# Patient Record
Sex: FEMALE | Race: WHITE | NOT HISPANIC OR LATINO
[De-identification: names, ages, dates, MRNs, and addresses within clinical notes are randomized per-mention and may not be internally consistent; named-entity substitution may affect disease eponyms.]

---

## 2017-03-20 ENCOUNTER — APPOINTMENT (OUTPATIENT)
Dept: BREAST CENTER | Facility: CLINIC | Age: 72
End: 2017-03-20

## 2017-03-20 VITALS
TEMPERATURE: 99.3 F | HEIGHT: 62 IN | SYSTOLIC BLOOD PRESSURE: 132 MMHG | WEIGHT: 125 LBS | BODY MASS INDEX: 23 KG/M2 | HEART RATE: 79 BPM | DIASTOLIC BLOOD PRESSURE: 72 MMHG

## 2017-03-20 RX ORDER — LETROZOLE 2.5 MG/1
TABLET, FILM COATED ORAL
Refills: 0 | Status: ACTIVE | COMMUNITY

## 2017-03-20 RX ORDER — TESTOSTERONE 75 MG/1
PELLET SUBCUTANEOUS
Refills: 0 | Status: ACTIVE | COMMUNITY

## 2017-04-06 ENCOUNTER — APPOINTMENT (OUTPATIENT)
Dept: PLASTIC SURGERY | Facility: CLINIC | Age: 72
End: 2017-04-06

## 2017-04-06 DIAGNOSIS — N65.1 DISPROPORTION OF RECONSTRUCTED BREAST: ICD-10-CM

## 2017-04-06 DIAGNOSIS — N65.1 DEFORMITY OF RECONSTRUCTED BREAST: ICD-10-CM

## 2017-04-06 DIAGNOSIS — C50.212 MALIGNANT NEOPLASM OF UPPER-INNER QUADRANT OF LEFT FEMALE BREAST: ICD-10-CM

## 2017-04-06 DIAGNOSIS — D17.1 BENIGN LIPOMATOUS NEOPLASM OF SKIN AND SUBCUTANEOUS TISSUE OF TRUNK: ICD-10-CM

## 2017-04-06 DIAGNOSIS — C50.912 MALIGNANT NEOPLASM OF UNSPECIFIED SITE OF LEFT FEMALE BREAST: ICD-10-CM

## 2017-04-06 DIAGNOSIS — R92.8 OTHER ABNORMAL AND INCONCLUSIVE FINDINGS ON DIAGNOSTIC IMAGING OF BREAST: ICD-10-CM

## 2017-04-06 DIAGNOSIS — Z90.12 ACQUIRED ABSENCE OF LEFT BREAST AND NIPPLE: ICD-10-CM

## 2017-04-06 DIAGNOSIS — N65.0 DEFORMITY OF RECONSTRUCTED BREAST: ICD-10-CM

## 2017-04-23 PROBLEM — D17.1 LIPOMA OF TORSO: Status: ACTIVE | Noted: 2017-04-23

## 2017-04-23 PROBLEM — N65.1 BREAST ASYMMETRY FOLLOWING RECONSTRUCTIVE SURGERY: Status: ACTIVE | Noted: 2017-04-23

## 2017-04-23 PROBLEM — Z90.12 ABSENCE OF NIPPLE, ACQUIRED, LEFT: Status: ACTIVE | Noted: 2017-04-23

## 2017-04-23 PROBLEM — N65.0 DEFORMITY AND DISPROPORTION OF RECONSTRUCTED BREAST: Status: ACTIVE | Noted: 2017-04-23

## 2017-08-28 ENCOUNTER — MESSAGE (OUTPATIENT)
Age: 72
End: 2017-08-28

## 2017-09-07 ENCOUNTER — FORM ENCOUNTER (OUTPATIENT)
Age: 72
End: 2017-09-07

## 2017-09-08 ENCOUNTER — RESULT REVIEW (OUTPATIENT)
Age: 72
End: 2017-09-08

## 2017-09-08 ENCOUNTER — OUTPATIENT (OUTPATIENT)
Dept: OUTPATIENT SERVICES | Facility: HOSPITAL | Age: 72
LOS: 1 days | End: 2017-09-08
Payer: MEDICARE

## 2017-09-08 DIAGNOSIS — R92.1 MAMMOGRAPHIC CALCIFICATION FOUND ON DIAGNOSTIC IMAGING OF BREAST: ICD-10-CM

## 2017-09-08 DIAGNOSIS — H26.9 UNSPECIFIED CATARACT: Chronic | ICD-10-CM

## 2017-09-08 DIAGNOSIS — Z41.9 ENCOUNTER FOR PROCEDURE FOR PURPOSES OTHER THAN REMEDYING HEALTH STATE, UNSPECIFIED: Chronic | ICD-10-CM

## 2017-09-08 DIAGNOSIS — N64.89 OTHER SPECIFIED DISORDERS OF BREAST: ICD-10-CM

## 2017-09-08 PROCEDURE — G0206: CPT | Mod: 26,RT

## 2017-09-08 PROCEDURE — 88305 TISSUE EXAM BY PATHOLOGIST: CPT

## 2017-09-08 PROCEDURE — 19083 BX BREAST 1ST LESION US IMAG: CPT | Mod: RT

## 2017-09-08 PROCEDURE — A4648: CPT

## 2017-09-08 PROCEDURE — 19083 BX BREAST 1ST LESION US IMAG: CPT

## 2017-09-08 PROCEDURE — 77065 DX MAMMO INCL CAD UNI: CPT

## 2017-09-11 LAB — SURGICAL PATHOLOGY STUDY: SIGNIFICANT CHANGE UP

## 2017-09-12 ENCOUNTER — OTHER (OUTPATIENT)
Age: 72
End: 2017-09-12

## 2017-12-20 ENCOUNTER — APPOINTMENT (OUTPATIENT)
Dept: BREAST CENTER | Facility: CLINIC | Age: 72
End: 2017-12-20
Payer: MEDICARE

## 2017-12-20 VITALS
HEIGHT: 62 IN | BODY MASS INDEX: 23.74 KG/M2 | HEART RATE: 70 BPM | SYSTOLIC BLOOD PRESSURE: 120 MMHG | RESPIRATION RATE: 14 BRPM | WEIGHT: 129 LBS | DIASTOLIC BLOOD PRESSURE: 71 MMHG

## 2017-12-20 DIAGNOSIS — Z85.3 PERSONAL HISTORY OF MALIGNANT NEOPLASM OF BREAST: ICD-10-CM

## 2017-12-20 DIAGNOSIS — Z12.31 ENCOUNTER FOR SCREENING MAMMOGRAM FOR MALIGNANT NEOPLASM OF BREAST: ICD-10-CM

## 2017-12-20 PROCEDURE — 99213 OFFICE O/P EST LOW 20 MIN: CPT

## 2017-12-20 RX ORDER — ATORVASTATIN CALCIUM 80 MG/1
TABLET, FILM COATED ORAL
Refills: 0 | Status: ACTIVE | COMMUNITY

## 2018-04-04 ENCOUNTER — FORM ENCOUNTER (OUTPATIENT)
Age: 73
End: 2018-04-04

## 2018-04-05 ENCOUNTER — OUTPATIENT (OUTPATIENT)
Dept: OUTPATIENT SERVICES | Facility: HOSPITAL | Age: 73
LOS: 1 days | End: 2018-04-05
Payer: MEDICARE

## 2018-04-05 ENCOUNTER — APPOINTMENT (OUTPATIENT)
Dept: MRI IMAGING | Facility: HOSPITAL | Age: 73
End: 2018-04-05
Payer: MEDICARE

## 2018-04-05 DIAGNOSIS — Z41.9 ENCOUNTER FOR PROCEDURE FOR PURPOSES OTHER THAN REMEDYING HEALTH STATE, UNSPECIFIED: Chronic | ICD-10-CM

## 2018-04-05 DIAGNOSIS — H26.9 UNSPECIFIED CATARACT: Chronic | ICD-10-CM

## 2018-04-05 PROCEDURE — 77049 MRI BREAST C-+ W/CAD BI: CPT

## 2018-04-05 PROCEDURE — A9585: CPT

## 2018-04-05 PROCEDURE — 77059 MRI BREAST BILATERAL: CPT | Mod: 26

## 2018-04-05 PROCEDURE — 0159T: CPT | Mod: 26

## 2018-04-05 PROCEDURE — C8937: CPT

## 2018-04-09 ENCOUNTER — RESULT REVIEW (OUTPATIENT)
Age: 73
End: 2018-04-09

## 2018-04-09 ENCOUNTER — CHART COPY (OUTPATIENT)
Age: 73
End: 2018-04-09

## 2018-04-10 ENCOUNTER — FORM ENCOUNTER (OUTPATIENT)
Age: 73
End: 2018-04-10

## 2018-04-11 ENCOUNTER — APPOINTMENT (OUTPATIENT)
Dept: ULTRASOUND IMAGING | Facility: HOSPITAL | Age: 73
End: 2018-04-11

## 2018-04-11 ENCOUNTER — OUTPATIENT (OUTPATIENT)
Dept: OUTPATIENT SERVICES | Facility: HOSPITAL | Age: 73
LOS: 1 days | End: 2018-04-11
Payer: MEDICARE

## 2018-04-11 DIAGNOSIS — H26.9 UNSPECIFIED CATARACT: Chronic | ICD-10-CM

## 2018-04-11 DIAGNOSIS — Z41.9 ENCOUNTER FOR PROCEDURE FOR PURPOSES OTHER THAN REMEDYING HEALTH STATE, UNSPECIFIED: Chronic | ICD-10-CM

## 2018-04-11 PROCEDURE — 76642 ULTRASOUND BREAST LIMITED: CPT

## 2018-04-11 PROCEDURE — 76642 ULTRASOUND BREAST LIMITED: CPT | Mod: 26,LT

## 2018-04-12 ENCOUNTER — TRANSCRIPTION ENCOUNTER (OUTPATIENT)
Age: 73
End: 2018-04-12

## 2018-04-13 ENCOUNTER — CHART COPY (OUTPATIENT)
Age: 73
End: 2018-04-13

## 2021-01-14 ENCOUNTER — APPOINTMENT (OUTPATIENT)
Dept: ENDOCRINOLOGY | Facility: CLINIC | Age: 76
End: 2021-01-14
Payer: MEDICARE

## 2021-01-14 VITALS
HEIGHT: 62 IN | TEMPERATURE: 98.1 F | OXYGEN SATURATION: 95 % | DIASTOLIC BLOOD PRESSURE: 74 MMHG | BODY MASS INDEX: 23.55 KG/M2 | SYSTOLIC BLOOD PRESSURE: 123 MMHG | HEART RATE: 84 BPM | WEIGHT: 128 LBS

## 2021-01-14 DIAGNOSIS — Z78.0 ASYMPTOMATIC MENOPAUSAL STATE: ICD-10-CM

## 2021-01-14 DIAGNOSIS — D05.12 INTRADUCTAL CARCINOMA IN SITU OF LEFT BREAST: ICD-10-CM

## 2021-01-14 DIAGNOSIS — R79.89 OTHER SPECIFIED ABNORMAL FINDINGS OF BLOOD CHEMISTRY: ICD-10-CM

## 2021-01-14 PROCEDURE — 99205 OFFICE O/P NEW HI 60 MIN: CPT

## 2021-03-03 ENCOUNTER — APPOINTMENT (OUTPATIENT)
Dept: OTOLARYNGOLOGY | Facility: CLINIC | Age: 76
End: 2021-03-03
Payer: MEDICARE

## 2021-03-03 VITALS — WEIGHT: 128 LBS | BODY MASS INDEX: 23.55 KG/M2 | TEMPERATURE: 97.9 F | HEIGHT: 62 IN

## 2021-03-03 DIAGNOSIS — H93.293 OTHER ABNORMAL AUDITORY PERCEPTIONS, BILATERAL: ICD-10-CM

## 2021-03-03 DIAGNOSIS — H60.8X3 OTHER OTITIS EXTERNA, BILATERAL: ICD-10-CM

## 2021-03-03 PROCEDURE — 99203 OFFICE O/P NEW LOW 30 MIN: CPT | Mod: 25

## 2021-03-03 PROCEDURE — 92557 COMPREHENSIVE HEARING TEST: CPT

## 2021-03-03 PROCEDURE — 92567 TYMPANOMETRY: CPT

## 2021-03-03 PROCEDURE — 69210 REMOVE IMPACTED EAR WAX UNI: CPT

## 2021-03-03 RX ORDER — LIOTHYRONINE SODIUM 5 UG/1
5 TABLET ORAL
Refills: 0 | Status: ACTIVE | COMMUNITY

## 2021-03-03 RX ORDER — TESTOSTERONE CYPIONATE 200 MG/ML
200 INJECTION, SOLUTION INTRAMUSCULAR
Refills: 0 | Status: ACTIVE | COMMUNITY

## 2021-03-03 NOTE — HISTORY OF PRESENT ILLNESS
[de-identified] : ROBYN COLON has a history of ear itching referred for possible cerumen retention.  No prior ear disease. No concerns of hearing loss.

## 2021-03-03 NOTE — ASSESSMENT
[FreeTextEntry1] : Ear hygiene reviewed in detail.  Follow up recommended if symptoms persist or progresses.  Routine follow up for cerumen management suggested.\par \par Mineral oil drops for ear hygiene suggested. Clinical monitoring of mild hearing loss recommended annually.

## 2021-03-03 NOTE — CONSULT LETTER
[Please see my note below.] : Please see my note below. [FreeTextEntry2] : Dear MARGARETTE LOVE  [FreeTextEntry1] : Thank you for allowing me to participate in the care of ROBYN COLON .\par Please see the attached visit note.\par \par \par \par Myron Noguera\par Otology\par Medical Director of [facility]\par Department of Otolaryngology\par [facility]

## 2021-03-03 NOTE — DATA REVIEWED
[de-identified] : Complete audiometry was ordered and completed today. I have interpreted these results and reviewed them in detail with the patient.\par \par

## 2021-03-03 NOTE — PHYSICAL EXAM
[Normal] : no rashes [FreeTextEntry1] : Microscopic ear exam with cerumen debridement:\par Dry ear canal skin with mild eczematous changes bilaterally.\par \par \par Right ear: Obstructing cerumen was debrided from the ear canal using suction, and curet.  The ear canal was otherwise within normal limits.  The tympanic membrane was intact and noninflamed.\par \par Left ear: Obstructing cerumen was debrided from the ear canal using suction, and curets.  The ear canal was otherwise within normal limits.  The tympanic membrane was intact and noninflamed.\par

## 2021-03-03 NOTE — REASON FOR VISIT
[Initial Evaluation] : an initial evaluation for [Hearing Loss] : hearing loss [FreeTextEntry2] : Ear Itching

## 2022-01-10 ENCOUNTER — APPOINTMENT (OUTPATIENT)
Dept: OTOLARYNGOLOGY | Facility: CLINIC | Age: 77
End: 2022-01-10
Payer: MEDICARE

## 2022-01-10 VITALS — TEMPERATURE: 97.2 F | HEIGHT: 62 IN | WEIGHT: 128 LBS | BODY MASS INDEX: 23.55 KG/M2

## 2022-01-10 DIAGNOSIS — J06.9 ACUTE UPPER RESPIRATORY INFECTION, UNSPECIFIED: ICD-10-CM

## 2022-01-10 DIAGNOSIS — H90.A22 SENSORINEURAL HEARING LOSS, UNILATERAL, LEFT EAR, WITH RESTRICTED HEARING ON THE CONTRALATERAL SIDE: ICD-10-CM

## 2022-01-10 PROCEDURE — 99213 OFFICE O/P EST LOW 20 MIN: CPT

## 2022-01-10 NOTE — HISTORY OF PRESENT ILLNESS
[de-identified] : ROBYN COLON has a history of ear itching referred for possible cerumen retention.  No prior ear disease. No concerns of hearing loss.  [FreeTextEntry1] : January 10, 2022:\par Presents with recent upper respiratory infection causing bilateral ear fullness pressure and hearing loss. No otorrhea. Treated with over-the-counter remedies as well as antibiotics by his primary care physician. Now feeling better. No fever or systemic symptoms.

## 2022-01-10 NOTE — PHYSICAL EXAM
[FreeTextEntry1] : Microscopic ear exam with cerumen debridement:\par \par Right ear: The ear canal was patent and nonobstructed.  The tympanic membrane was intact and noninflamed.\par \par Left ear: Obstructing cerumen was debrided from the ear canal using suction, and curet.  The ear canal was within normal limits.  The tympanic membrane was intact and noninflamed. [Normal] : mucosa is normal [Midline] : trachea located in midline position

## 2022-01-10 NOTE — ASSESSMENT
[FreeTextEntry1] : Resolving symptoms of an upper respiratory tract infection, which may have included sinusitis, and or otitis media. There is no current evidence of inflammation or effusion. I reviewed this with her. We reviewed conservative measures for supportive care. I have not recommended intervention at this time.\par \par Followup recommended as needed. Repeat audiometry recommended in the future.

## 2024-05-10 ENCOUNTER — APPOINTMENT (OUTPATIENT)
Dept: OTOLARYNGOLOGY | Facility: CLINIC | Age: 79
End: 2024-05-10
Payer: MEDICARE

## 2024-05-10 DIAGNOSIS — H61.23 IMPACTED CERUMEN, BILATERAL: ICD-10-CM

## 2024-05-10 DIAGNOSIS — H93.8X2 OTHER SPECIFIED DISORDERS OF LEFT EAR: ICD-10-CM

## 2024-05-10 PROCEDURE — 69210 REMOVE IMPACTED EAR WAX UNI: CPT

## 2024-05-10 PROCEDURE — 99213 OFFICE O/P EST LOW 20 MIN: CPT | Mod: 25

## 2024-05-10 NOTE — CONSULT LETTER
[Dear  ___] : Dear  [unfilled], [Courtesy Letter:] : I had the pleasure of seeing your patient, [unfilled], in my office today. [Please see my note below.] : Please see my note below. [Consult Closing:] : Thank you very much for allowing me to participate in the care of this patient.  If you have any questions, please do not hesitate to contact me. [Sincerely,] : Sincerely, [FreeTextEntry3] : Salome Donohue MD [facility] Otolaryngology  Head & Neck Surgery [facility]   Department of Otolaryngology [employer] [facility] [employer] [facility] at [facility]  Office Tel: [phone]

## 2024-05-10 NOTE — HISTORY OF PRESENT ILLNESS
[de-identified] : ROBYN COLON is a 78 year old patient Here for cerumen impaction.  She has had left ear pressure with mild discomfort.  She was found to have wax buildup.  She has no otalgia, otorrhea, tinnitus, or dizziness.  No history of ear disease

## 2024-05-10 NOTE — ASSESSMENT
[FreeTextEntry1] : She had cerumen impaction bilaterally which was removed.  The left ear was completely obstructed.  Her ears were otherwise normal on exam  PLAN  -findings and management options discussed in detail with the patient.  -good aural hygiene -avoid using cotton swabs in the ears -wax removal drops as needed.  -noise precautions -She declined an audiogram to check her hearing -follow up in 1 year or earlier if needed to check her ears

## 2024-05-10 NOTE — PHYSICAL EXAM
[TextEntry] : PHYSICAL EXAM  General: The patient was alert, oriented and in no distress. Voice was clear.  Face: The patient had no facial asymmetry or mass. The skin was unremarkable.  Ears: Hearing normal to conversational voice External ears were normal without deformity. Ear canals- cerumen impaction  PROCEDURE- EAR MICROSCOPY AND CERUMEN REMOVAL  Indication: cerumen removal  Under the microscope, obstructing cerumen was removed atraumatically from the both ears with a suction, curette and/or forceps.  The left ear was completely obstructed and had more wax.  Canals: normal. no inflammation.  Tympanic membranes: Intact. no perforation or effusion.  Nose:  The external nose had no significant deformity.   . On anterior rhinoscopy, the nasal mucosa was clear.  The anterior septum was grossly midline. There were no visualized polyps, purulence  or masses.   Oral cavity: Oral mucosa- normal. Oral and base of tongue- clear and without mass. Gingival and buccal mucosa- moist and without lesions. Palate- the palate moved well. There was no cleft palate. There appeared to be good salivary flow.   Oral cavity/oropharynx- no pus, erythema or mass  Neck:  The neck was symmetrical. The parotid and submandibular glands were normal without masses. The trachea was midline and there was no unusual crepitus. Thyroid was smooth and nontender and no masses were palpated. No masses  Lymphatics: Cervical adenopathy- none.